# Patient Record
Sex: MALE
[De-identification: names, ages, dates, MRNs, and addresses within clinical notes are randomized per-mention and may not be internally consistent; named-entity substitution may affect disease eponyms.]

---
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
## 2024-04-08 PROBLEM — Z00.00 ENCOUNTER FOR PREVENTIVE HEALTH EXAMINATION: Status: ACTIVE | Noted: 2024-04-08

## 2024-05-30 ENCOUNTER — NON-APPOINTMENT (OUTPATIENT)
Age: 19
End: 2024-05-30

## 2024-05-30 ENCOUNTER — APPOINTMENT (OUTPATIENT)
Dept: GASTROENTEROLOGY | Facility: CLINIC | Age: 19
End: 2024-05-30
Payer: MEDICAID

## 2024-05-30 VITALS
HEART RATE: 81 BPM | DIASTOLIC BLOOD PRESSURE: 78 MMHG | RESPIRATION RATE: 16 BRPM | HEIGHT: 67 IN | SYSTOLIC BLOOD PRESSURE: 125 MMHG | WEIGHT: 240 LBS | OXYGEN SATURATION: 98 % | BODY MASS INDEX: 37.67 KG/M2

## 2024-05-30 DIAGNOSIS — R79.89 OTHER SPECIFIED ABNORMAL FINDINGS OF BLOOD CHEMISTRY: ICD-10-CM

## 2024-05-30 DIAGNOSIS — Z83.49 FAMILY HISTORY OF OTHER ENDOCRINE, NUTRITIONAL AND METABOLIC DISEASES: ICD-10-CM

## 2024-05-30 DIAGNOSIS — Z83.3 FAMILY HISTORY OF DIABETES MELLITUS: ICD-10-CM

## 2024-05-30 DIAGNOSIS — K76.0 FATTY (CHANGE OF) LIVER, NOT ELSEWHERE CLASSIFIED: ICD-10-CM

## 2024-05-30 PROCEDURE — 99205 OFFICE O/P NEW HI 60 MIN: CPT

## 2024-05-30 RX ORDER — CYANOCOBALAMIN (VITAMIN B-12) 500 MCG
400 LOZENGE ORAL
Qty: 30 | Refills: 10 | Status: ACTIVE | COMMUNITY
Start: 2024-05-30 | End: 1900-01-01

## 2024-05-30 RX ORDER — ACETAMINOPHEN 325 MG/1
TABLET, FILM COATED ORAL
Refills: 0 | Status: ACTIVE | COMMUNITY

## 2024-05-30 NOTE — REVIEW OF SYSTEMS
Caller: En Saenz    Relationship: Self    Best call back number: [phone]     What is the best time to reach you: ANY    Who are you requesting to speak with (clinical staff, provider,  specific staff member): CLINICAL    What was the call regarding: PATIENT STATES HE IS NEEDING FMLA PAPERWORK FILLED OUT TO HELP ASSIST IN CARE FOR HIS MOTHER. PATIENTS MOTHER IS A PATIENT AT THE [place] OFFICE WITH [employer]. PATIENT WOULD LIKE TO FURTHER DISCUSS THIS PROCESS AND FIND OUT HOW TO GET HIS PAPERWORK FILLED OUT     Do you require a callback: YES         [As Noted in HPI] : as noted in HPI [Negative] : Heme/Lymph

## 2024-05-31 NOTE — ADDENDUM
[FreeTextEntry1] : I, Sarah Harmon NP, acted as scribe for YASIR Zambrano for this patient encounter.

## 2024-05-31 NOTE — ASSESSMENT
[FreeTextEntry1] : TACHO SALES is a 18 year old male with a PMH of Hepatic Steatosis and Anxiety.  [facility] -Labs 3/2024 - tbili 0.6, , , AP 90, INR 0.9. Autoimmune serologies, viral hepatitis serologies, A1AT, ceruloplasmin, iron studies, celiac serologies and EBV were all negative. MRCP w/wo IVC 2/2024 - moderate fatty infiltration of the liver, fat fraction 27%. -Etiology of fatty liver disease explained to pt in detail along with complications of disease progression. -Recommend lifestyle modifications in the form of diet and exercise. Gradual weight loss of 7-10% of current body weight. These changes have been shown to lead to regression or even resolution of steatosis, inflammation, and even fibrosis in some patients.  -Copy of Mediterranean diet given. -Start Vitamin E 400 IU/day -will consider FibroScan at next visit to r/o fibrosis -If LFTs remain elevated despite lifestyle modifications, will consider Liver Biopsy.   Follow up in 6-9 months w/labs

## 2024-05-31 NOTE — HISTORY OF PRESENT ILLNESS
[de-identified] : TACHO SALES is a 18 year old male with a PMH of Hepatic Steatosis and Anxiety.   He presents today, accompanied by his father, for evaluation of elevated LFTs, referred by [facility]. Records reviewed, including notes, labs, imaging, etc. Per pt's father, pt has had elevated LFTs at least for the last 3 years. He reports weight gain over the last year and poor diet. Denies significant alcohol consumption or otc/herbal supplements. Denies new meds in the last 1 year. No family history of liver disease.  Labs 3/2024 - tbili 0.6, , , AP 90, INR 0.9. Autoimmune serologies, viral hepatitis serologies, A1AT, ceruloplasmin, iron studies, celiac serologies and EBV were all negative. +Immunity to HAV and HBV.  MRCP w/wo IVC 2/2024 - moderate fatty infiltration of the liver, fat fraction 27%.   Denies recent infection, abdominal pain or distension, jaundice, hematemesis, hematochezia, dark urine, confusion or unintentional weight loss. Denies significant alcohol consumption or otc/herbal supplements. Denies family history of liver disease.

## 2024-05-31 NOTE — PHYSICAL EXAM
[Non-Tender] : non-tender [General Appearance - Alert] : alert [Sclera] : the sclera and conjunctiva were normal [Edema] : there was no peripheral edema [Bowel Sounds] : normal bowel sounds [Abdomen Soft] : soft [Abdomen Tenderness] : non-tender [] : no hepato-splenomegaly [Abdomen Mass (___ Cm)] : no abdominal mass palpated [Oriented To Time, Place, And Person] : oriented to person, place, and time [Scleral Icterus] : No Scleral Icterus [Spider Angioma] : No spider angioma(s) were observed [Abdominal  Ascites] : no ascites [Asterixis] : no asterixis observed [Jaundice] : No jaundice [Palmar Erythema] : no Palmar Erythema

## 2024-05-31 NOTE — REASON FOR VISIT
[Consultation] : a consultation visit [Parent] : parent [FreeTextEntry1] : elevated LFTs, hepatic steatosis

## 2024-12-10 ENCOUNTER — APPOINTMENT (OUTPATIENT)
Dept: GASTROENTEROLOGY | Facility: CLINIC | Age: 19
End: 2024-12-10